# Patient Record
(demographics unavailable — no encounter records)

---

## 2025-01-16 NOTE — DISCUSSION/SUMMARY
[FreeTextEntry1] : This yamilet patient began transitioning since 20 years ago, at age 18, around 2004. She has been on hormonal therapy consistently since 2004, but has stopped 6 months ago due to insurance issues. She has been in therapy and was diagnosed with Gender Dysphoria concerned about certain facial features that appear masculine and cause bullying desires facial feminization surgery followed by Transgender team. The following facial features appear masculine and will need to be modified: -  Allergies: - NKA  Meds: - OTC probiotics - Vitamin B-12  Surgical History Surgery Type: Breast augmentation Year: 2008 Complications?: None  Surgery Type: Breast augmentation, revision Year: 2021 Complications?: None  Surgery Type: Silicone removal of hip/buttock region Complications?: None  Denies previous botox, fillers or silicone injections to the face.  SH: Denies marijuana use, Denies Cigarette use  ROS: General health / Constitutional: no fever, no chills, no unusual weight changes, no energy level changes, no night sweats Skin: No color or pigmentation changes, no pruritis, no rashes, no ulcers, Hair: No changes in color, texture, distribution, loss Nails: No color changes, brittleness, infection Head: No headaches, no new jaw pain Eyes: Good visual acuity, no color blindness, no corrective lenses, no photophobia, no diplopia, no blurred vision, no infection, pain, no medications, Ear: no tinnitus, no discharge, no pain, no medications Nose: no epistaxis, no rhinorrhea, no rhinitis, no pain, Mouth & Throat: no gingivitis, no gingival bleeding, no ulcers, no voice changes, no changes in oral mucosa or tongue Neck no stiffness, no pain, no lymphadenitis, no thyroid disorders, Respiratory: no cough, no dyspnea, no wheezing, no chest pain, cyanosis, no pneumonia, no asthma, Cardiovascular: no chest pain, no palpitations, no irregular rhythm, no tachycardia, no bradycardia, no heart failure, no dyspnea on exertion (CLEARY), no edema Gastrointestinal: no nausea / vomiting, no dysphagia, no reflux / GERD, no abdominal pain, no jaundice Musculoskeletal: no pain in muscles, bones, or joints; no fractures, no dislocations, no muscular weakness, no atrophy Neurological: no paresis, no paralysis, no paresthesia, no seizures, no dizziness, no syncope, no ataxia, no tremor Psychological: no childhood behavioral problems, no irritability, no sleep changes Hematological: no anemia, no bruising, no bleeding tendencies,  PHYSICAL EXAM General: WDWN, in no distress, A & O x 3 (person, place, time) HEENT Head: AT/NC (atraumatic, normocephalic), including TMJ, sensory and motor; + Prominent brow and lateral orbital rim type III Eyes: EOMI, PERRLA Ears: exterior, nl hearing, Nose: + slightly wide, bulbous nasal tip with acute nasolabial angle intranasal exam showed enlarged turbinates and deviated caudal septum Throat & mouth: gd palate elevation, nl tongue mobility, nl tonsil size; + Prominent mandibular angle with active masseter, boxy wide chin, Hypoplastic cheeks, Hypoplastic upper and lower lips. Neck: no masses, nl pulses, no prominent tracheal bulge ("Saleem's Apple")  We had a 45 minute meeting with the patient discussing diagnosis and medical management issues and outcomes.  First stage FFS: 21139: Frontal sinus anterior wall setback 78486: Orbital reconstruction bilateral 38658: Hairline lowering 08780: Browlift bilateral 13197: Supraorbital bar recontouring bilateral 88874: Tarsorrhaphy bilateral 73287: Mandibular angle contouring 79857-05: Mandibular angle osteotomy 98672: Mandibular reconstruction with autologous tissue bilateral 83805: Osseous genioplasty narrowing, shortening 63690: Mandibular reconstruction with prosthetics bilateral 75600: Rhinoplasty open 99593: Submucous resection of septum 15626: Cartilage grafting for nasal reconstruction, use of cadaveric cartilage for  grafts, columellar strut, tip graft 74969: Tracheal shave, Laryngeothyroidoplasty 29922: Submental fat or buccal fat excision, 58128: platysmaplasty 39834: Upper lip augmentation with temporalis fascia 38351: Fat grafting to malar regions bilateral from abdomen first 25 ccs 22092: Fat grafting to malar regions bilateral from abdomen > 25 ccs 25168-16: Bilateral cheek implants 35857: Rhytidectomy, facelift, lateral platsymoplasty, SMAS-ectomy, MACs-lift 91225: Silicone removal bilateral malar regions  Needs a 3D CT scan and Virtual Surgical Planning. She will need to provide a letter from her therapist and hormone provider. Will need PCP clearance.

## 2025-01-16 NOTE — DISCUSSION/SUMMARY
[FreeTextEntry1] : This yamilet patient began transitioning since 20 years ago, at age 18, around 2004. She has been on hormonal therapy consistently since 2004, but has stopped 6 months ago due to insurance issues. She has been in therapy and was diagnosed with Gender Dysphoria concerned about certain facial features that appear masculine and cause bullying desires facial feminization surgery followed by Transgender team. The following facial features appear masculine and will need to be modified: -  Allergies: - NKA  Meds: - OTC probiotics - Vitamin B-12  Surgical History Surgery Type: Breast augmentation Year: 2008 Complications?: None  Surgery Type: Breast augmentation, revision Year: 2021 Complications?: None  Surgery Type: Silicone removal of hip/buttock region Complications?: None  Denies previous botox, fillers or silicone injections to the face.  SH: Denies marijuana use, Denies Cigarette use  ROS: General health / Constitutional: no fever, no chills, no unusual weight changes, no energy level changes, no night sweats Skin: No color or pigmentation changes, no pruritis, no rashes, no ulcers, Hair: No changes in color, texture, distribution, loss Nails: No color changes, brittleness, infection Head: No headaches, no new jaw pain Eyes: Good visual acuity, no color blindness, no corrective lenses, no photophobia, no diplopia, no blurred vision, no infection, pain, no medications, Ear: no tinnitus, no discharge, no pain, no medications Nose: no epistaxis, no rhinorrhea, no rhinitis, no pain, Mouth & Throat: no gingivitis, no gingival bleeding, no ulcers, no voice changes, no changes in oral mucosa or tongue Neck no stiffness, no pain, no lymphadenitis, no thyroid disorders, Respiratory: no cough, no dyspnea, no wheezing, no chest pain, cyanosis, no pneumonia, no asthma, Cardiovascular: no chest pain, no palpitations, no irregular rhythm, no tachycardia, no bradycardia, no heart failure, no dyspnea on exertion (CLEARY), no edema Gastrointestinal: no nausea / vomiting, no dysphagia, no reflux / GERD, no abdominal pain, no jaundice Musculoskeletal: no pain in muscles, bones, or joints; no fractures, no dislocations, no muscular weakness, no atrophy Neurological: no paresis, no paralysis, no paresthesia, no seizures, no dizziness, no syncope, no ataxia, no tremor Psychological: no childhood behavioral problems, no irritability, no sleep changes Hematological: no anemia, no bruising, no bleeding tendencies,  PHYSICAL EXAM General: WDWN, in no distress, A & O x 3 (person, place, time) HEENT Head: AT/NC (atraumatic, normocephalic), including TMJ, sensory and motor; + Prominent brow and lateral orbital rim type III Eyes: EOMI, PERRLA Ears: exterior, nl hearing, Nose: + slightly wide, bulbous nasal tip with acute nasolabial angle intranasal exam showed enlarged turbinates and deviated caudal septum Throat & mouth: gd palate elevation, nl tongue mobility, nl tonsil size; + Prominent mandibular angle with active masseter, boxy wide chin, Hypoplastic cheeks, Hypoplastic upper and lower lips. Neck: no masses, nl pulses, no prominent tracheal bulge ("Saleem's Apple")  We had a 45 minute meeting with the patient discussing diagnosis and medical management issues and outcomes.  First stage FFS: 21139: Frontal sinus anterior wall setback 38158: Orbital reconstruction bilateral 61759: Hairline lowering 83872: Browlift bilateral 11490: Supraorbital bar recontouring bilateral 69437: Tarsorrhaphy bilateral 33989: Mandibular angle contouring 19337-23: Mandibular angle osteotomy 45602: Mandibular reconstruction with autologous tissue bilateral 48064: Osseous genioplasty narrowing, shortening 56927: Mandibular reconstruction with prosthetics bilateral 86076: Rhinoplasty open 72265: Submucous resection of septum 18057: Cartilage grafting for nasal reconstruction, use of cadaveric cartilage for  grafts, columellar strut, tip graft 88776: Tracheal shave, Laryngeothyroidoplasty 05504: Submental fat or buccal fat excision, 04066: platysmaplasty 87191: Upper lip augmentation with temporalis fascia 83840: Fat grafting to malar regions bilateral from abdomen first 25 ccs 61976: Fat grafting to malar regions bilateral from abdomen > 25 ccs 78244-65: Bilateral cheek implants 62933: Rhytidectomy, facelift, lateral platsymoplasty, SMAS-ectomy, MACs-lift 98022: Silicone removal bilateral malar regions  Needs a 3D CT scan and Virtual Surgical Planning. She will need to provide a letter from her therapist and hormone provider. Will need PCP clearance.

## 2025-04-15 NOTE — DISCUSSION/SUMMARY
[FreeTextEntry1] : History of Present Illness       The provider, PACO SWAIN, was located at the medical office located in  at the time of the visit. The patient, JOSE LUIS SANTIAGO and Provider participated in the telehealth encounter.  Active Problems Gender dysphoria (302.6) (F64.9)  Discussion/Summary     This visit was provided via telehealth using real-time 2 way audio and visual technology. The patient was located at home and I was located at my office at [address] [place] [address], [address] at the time of the telehealth visit.Verbal consent was given by the patient; both the patient and I participated in the telehealth encounter.  This yamilet patient began transitioning since 20 years ago, at age 18, around 2004. She has been on hormonal therapy consistently since 2004, but has stopped 6 months ago due to insurance issues. She has been in therapy and was diagnosed with Gender Dysphoria concerned about certain facial features that appear masculine and cause bullying desires facial feminization surgery followed by Transgender team. The following facial features appear masculine and will need to be modified: -  Allergies: - NKA  Meds: - OTC probiotics - Vitamin B-12 -spironolactone 100mg -estradiol 200mg/5mL injection, 15mL every other week -vyvanse -wegovy: last dose late March 2025  Surgical History Surgery Type: Breast augmentation Year: 2008 Complications?: None  Surgery Type: Breast augmentation, revision Year: 2021 Complications?: None  Surgery Type: Silicone removal of hip/buttock region Year:2019 Complications?: None  Silicone injections to hip/buttocks, 2010 approximately  Denies previous botox, fillers or silicone injections to the face.  SH: Denies marijuana use, Denies Cigarette use, no vaping No alcohol use  ROS: General health / Constitutional: no fever, no chills, no unusual weight changes, no energy level changes, no night sweats Skin: No color or pigmentation changes, no pruritis, no rashes, no ulcers, Hair: No changes in color, texture, distribution, loss Nails: No color changes, brittleness, infection Head: No headaches, no new jaw pain Eyes: Good visual acuity, no color blindness, no corrective lenses, no photophobia, no diplopia, no blurred vision, no infection, pain, no medications, Ear: no tinnitus, no discharge, no pain, no medications Nose: no epistaxis, no rhinorrhea, no rhinitis, no pain, Mouth & Throat: no gingivitis, no gingival bleeding, no ulcers, no voice changes, no changes in oral mucosa or tongue Neck no stiffness, no pain, no lymphadenitis, no thyroid disorders, Respiratory: no cough, no dyspnea, no wheezing, no chest pain, cyanosis, no pneumonia, no asthma, Cardiovascular: no chest pain, no palpitations, no irregular rhythm, no tachycardia, no bradycardia, no heart failure, no dyspnea on exertion (CLEARY), no edema Gastrointestinal: no nausea / vomiting, no dysphagia, no reflux / GERD, no abdominal pain, no jaundice Musculoskeletal: no pain in muscles, bones, or joints; no fractures, no dislocations, no muscular weakness, no atrophy Neurological: no paresis, no paralysis, no paresthesia, no seizures, no dizziness, no syncope, no ataxia, no tremor Psychological: no childhood behavioral problems, no irritability, no sleep changes Hematological: no anemia, no bruising, no bleeding tendencies,  PHYSICAL EXAM General: WDWN, in no distress, A & O x 3 (person, place, time) HEENT Head: AT/NC (atraumatic, normocephalic), including TMJ, sensory and motor; + Prominent brow and lateral orbital rim type III Eyes: EOMI, PERRLA Ears: exterior, nl hearing, Nose: + slightly wide, bulbous nasal tip with acute nasolabial angle intranasal exam showed enlarged turbinates and deviated caudal septum Throat & mouth: gd palate elevation, nl tongue mobility, nl tonsil size; + Prominent mandibular angle with active masseter, boxy wide chin, Hypoplastic cheeks, Hypoplastic upper and lower lips. Neck: no masses, nl pulses, no prominent tracheal bulge ("Saleem's Apple")  We had a 45 minute meeting with the patient discussing diagnosis and medical management issues and outcomes.   FFS: 51631: Frontal sinus anterior wall setback 54262: Orbital reconstruction bilateral 57574: Hairline lowering 62725: Browlift bilateral 20017: Supraorbital bar recontouring bilateral 27924: Tarsorrhaphy bilateral 68615: Mandibular angle contouring 62307-76: Mandibular angle osteotomy 52283: Osseous genioplasty narrowing, shortening 60539: Rhinoplasty open 70048: Submucous resection of septum 81210: Cartilage grafting for nasal reconstruction, use of cadaveric cartilage for  grafts, columellar strut, tip graft 95058: Tracheal shave, Laryngeothyroidoplasty 71798: Submental fat or buccal fat excision, 65827: platysmaplasty 76872: Upper lip augmentation with temporalis fascia 10062: Fat grafting to malar regions bilateral from abdomen first 25 ccs 10448: Fat grafting to malar regions bilateral from abdomen > 25 ccs 21088-30: Bilateral cheek implants 27736: Rhytidectomy, facelift, lateral platsymoplasty, SMAS-ectomy, MACs-lift 96570: Silicone removal bilateral malar regions     The patient's medications, allergies, and PMH were updated to reflect accuracy.  All of patient's questions and concerns were answered during this appointment. The patient met with Paco Swain M.D. in conjunction with Maryse Lucio N.P. All patient questions and concerns were answered and addressed during this appointment time.     - Prepare soft foods (applesauce, mash potatoes, smoothies, soups, oatmeal) for postop soft diet. - Your at-home post op medications will be available for  at the hospital's pharmacy when you are discharged. - Do not eat or drink after 12am the night before surgery. - The hospital will call you the day before surgery to let you know what time your surgery starts and what time to arrive to the hospital. - Contact us with any questions or concerns. -Stay in [place] for at least 7-14 days   We had a 35 minute meeting with the patient discussing diagnosis and medical management issues and outcomes. CT scan received, letters received, patient is completing her PST with her primary care doctor.   We discussed the instructions and the patient confirmed an understanding of them. We reviewed these procedures and their risks   Bleeding- It is possible, though unusual, to experience a bleeding episode during or after surgery. Should post-operative bleeding occur, it may require emergency treatment to drain accumulated blood or blood transfusion. Intra-operative blood transfusion may also be required. Do not take any aspirin or anti-inflammatory medications for ten days before or after surgery, as this may increase the risk of bleeding. Non-prescription herbs and dietary supplements can increase the risk of surgical bleeding. Hematoma can occur at any time following injury to the breast. If blood transfusions are necessary to treat blood loss, there is the risk of blood-related infections such as hepatitis and HIV (AIDS). Heparin medications that are used to prevent blood clots in veins can produce bleeding and decreased blood platelets.   Infection- Infection is unusual after surgery. Should an infection occur, additional treatment including antibiotics, hospitalization, or additional surgery may be necessary.   Change Skin Sensation- You may experience a diminished (or loss) of sensitivity of the skin of operative area. Partial or permanent loss of skin sensation can occur after surgery. Skin Contour Irregularities- Contour and shape irregularities may occur after surgery. Visible and palpable wrinkling may occur. Residual skin irregularities at the ends of the incisions or dog ears are always a possibility when there is excessive redundant skin. This may improve with time, or it can be surgically corrected.   Sutures- Most surgical techniques use deep sutures. You may notice these sutures after your surgery. Sutures may spontaneously poke through the skin, become visible or produce irritation that requires suture removal.   Skin Discoloration / Swelling- Some bruising and swelling normally occurs following surgery. The skin in or near the surgical site can appear either lighter or darker than surrounding skin. Although uncommon, swelling and skin discoloration may persist for long periods of time and, in rare situations, may be permanent.   Skin Sensitivity- Itching, tenderness, or exaggerated responses to hot or cold temperatures may occur after surgery. Usually this resolves during healing, but in rare situations it may be chronic.   Scarring- All surgery leaves scars, some more visible than others. Although good wound healing after a surgical procedure is expected, abnormal scars may occur within the skin and deeper tissues. Scars may be unattractive and of different color than the surrounding skin tone. Scar appearance may also vary within the same scar. Scars may be asymmetrical (appear different on the right and left side of the body). There is the possibility of visible marks in the skin from sutures. In some cases scars may require surgical revision or treatment.   Damage to Deeper Structures- There is the potential for injury to deeper structures including, nerves, blood vessels, muscles during any surgical procedure. The potential for this to occur varies according to the type of procedure being performed. Injury to deeper structures may be temporary or permanent.   Delayed Healing- Wound disruption or delayed wound healing is possible. Some areas of the skin may not heal normally and may take a long time to heal. Areas of skin tissue may die. This may require frequent dressing changes or further surgery to remove the non-healed tissue. Individuals who have decreased blood supply to tissue from past surgery or radiation therapy may be at increased risk for wound healing and poor surgical outcome. Smokers have a greater risk of skin loss and wound healing complications.   Fat Necrosis- Fatty tissue found deep in the skin might die. This may produce areas of firmness within the skin. Additional surgery to remove areas of fat necrosis may be necessary. There is the possibility of contour irregularities in the skin that may result from fat necrosis.   Allergic Reactions- In rare cases, local allergies to tape, suture material and glues, blood products, topical preparations or injected agents have been reported. Serious systemic reactions including shock (anaphylaxis) may occur in response to drugs used during surgery and prescription medicines. Allergic reactions may require additional treatment.

## 2025-04-15 NOTE — DISCUSSION/SUMMARY
[FreeTextEntry1] : History of Present Illness       The provider, PACO SWAIN, was located at the medical office located in  at the time of the visit. The patient, JOSE LUIS SANTIAGO and Provider participated in the telehealth encounter.  Active Problems Gender dysphoria (302.6) (F64.9)  Discussion/Summary     This visit was provided via telehealth using real-time 2 way audio and visual technology. The patient was located at home and I was located at my office at [address] [place] [address], [address] at the time of the telehealth visit.Verbal consent was given by the patient; both the patient and I participated in the telehealth encounter.  This yamilet patient began transitioning since 20 years ago, at age 18, around 2004. She has been on hormonal therapy consistently since 2004, but has stopped 6 months ago due to insurance issues. She has been in therapy and was diagnosed with Gender Dysphoria concerned about certain facial features that appear masculine and cause bullying desires facial feminization surgery followed by Transgender team. The following facial features appear masculine and will need to be modified: -  Allergies: - NKA  Meds: - OTC probiotics - Vitamin B-12 -spironolactone 100mg -estradiol 200mg/5mL injection, 15mL every other week -vyvanse -wegovy: last dose late March 2025  Surgical History Surgery Type: Breast augmentation Year: 2008 Complications?: None  Surgery Type: Breast augmentation, revision Year: 2021 Complications?: None  Surgery Type: Silicone removal of hip/buttock region Year:2019 Complications?: None  Silicone injections to hip/buttocks, 2010 approximately  Denies previous botox, fillers or silicone injections to the face.  SH: Denies marijuana use, Denies Cigarette use, no vaping No alcohol use  ROS: General health / Constitutional: no fever, no chills, no unusual weight changes, no energy level changes, no night sweats Skin: No color or pigmentation changes, no pruritis, no rashes, no ulcers, Hair: No changes in color, texture, distribution, loss Nails: No color changes, brittleness, infection Head: No headaches, no new jaw pain Eyes: Good visual acuity, no color blindness, no corrective lenses, no photophobia, no diplopia, no blurred vision, no infection, pain, no medications, Ear: no tinnitus, no discharge, no pain, no medications Nose: no epistaxis, no rhinorrhea, no rhinitis, no pain, Mouth & Throat: no gingivitis, no gingival bleeding, no ulcers, no voice changes, no changes in oral mucosa or tongue Neck no stiffness, no pain, no lymphadenitis, no thyroid disorders, Respiratory: no cough, no dyspnea, no wheezing, no chest pain, cyanosis, no pneumonia, no asthma, Cardiovascular: no chest pain, no palpitations, no irregular rhythm, no tachycardia, no bradycardia, no heart failure, no dyspnea on exertion (CLEARY), no edema Gastrointestinal: no nausea / vomiting, no dysphagia, no reflux / GERD, no abdominal pain, no jaundice Musculoskeletal: no pain in muscles, bones, or joints; no fractures, no dislocations, no muscular weakness, no atrophy Neurological: no paresis, no paralysis, no paresthesia, no seizures, no dizziness, no syncope, no ataxia, no tremor Psychological: no childhood behavioral problems, no irritability, no sleep changes Hematological: no anemia, no bruising, no bleeding tendencies,  PHYSICAL EXAM General: WDWN, in no distress, A & O x 3 (person, place, time) HEENT Head: AT/NC (atraumatic, normocephalic), including TMJ, sensory and motor; + Prominent brow and lateral orbital rim type III Eyes: EOMI, PERRLA Ears: exterior, nl hearing, Nose: + slightly wide, bulbous nasal tip with acute nasolabial angle intranasal exam showed enlarged turbinates and deviated caudal septum Throat & mouth: gd palate elevation, nl tongue mobility, nl tonsil size; + Prominent mandibular angle with active masseter, boxy wide chin, Hypoplastic cheeks, Hypoplastic upper and lower lips. Neck: no masses, nl pulses, no prominent tracheal bulge ("Saleem's Apple")  We had a 45 minute meeting with the patient discussing diagnosis and medical management issues and outcomes.   FFS: 06290: Frontal sinus anterior wall setback 98414: Orbital reconstruction bilateral 61454: Hairline lowering 78838: Browlift bilateral 97270: Supraorbital bar recontouring bilateral 30614: Tarsorrhaphy bilateral 72478: Mandibular angle contouring 90416-36: Mandibular angle osteotomy 34243: Osseous genioplasty narrowing, shortening 55750: Rhinoplasty open 32444: Submucous resection of septum 88566: Cartilage grafting for nasal reconstruction, use of cadaveric cartilage for  grafts, columellar strut, tip graft 77463: Tracheal shave, Laryngeothyroidoplasty 52382: Submental fat or buccal fat excision, 95167: platysmaplasty 66740: Upper lip augmentation with temporalis fascia 22658: Fat grafting to malar regions bilateral from abdomen first 25 ccs 27506: Fat grafting to malar regions bilateral from abdomen > 25 ccs 03280-39: Bilateral cheek implants 53375: Rhytidectomy, facelift, lateral platsymoplasty, SMAS-ectomy, MACs-lift 20932: Silicone removal bilateral malar regions     The patient's medications, allergies, and PMH were updated to reflect accuracy.  All of patient's questions and concerns were answered during this appointment. The patient met with Paco Swain M.D. in conjunction with Maryse Lucio N.P. All patient questions and concerns were answered and addressed during this appointment time.     - Prepare soft foods (applesauce, mash potatoes, smoothies, soups, oatmeal) for postop soft diet. - Your at-home post op medications will be available for  at the hospital's pharmacy when you are discharged. - Do not eat or drink after 12am the night before surgery. - The hospital will call you the day before surgery to let you know what time your surgery starts and what time to arrive to the hospital. - Contact us with any questions or concerns. -Stay in [place] for at least 7-14 days   We had a 35 minute meeting with the patient discussing diagnosis and medical management issues and outcomes. CT scan received, letters received, patient is completing her PST with her primary care doctor.   We discussed the instructions and the patient confirmed an understanding of them. We reviewed these procedures and their risks   Bleeding- It is possible, though unusual, to experience a bleeding episode during or after surgery. Should post-operative bleeding occur, it may require emergency treatment to drain accumulated blood or blood transfusion. Intra-operative blood transfusion may also be required. Do not take any aspirin or anti-inflammatory medications for ten days before or after surgery, as this may increase the risk of bleeding. Non-prescription herbs and dietary supplements can increase the risk of surgical bleeding. Hematoma can occur at any time following injury to the breast. If blood transfusions are necessary to treat blood loss, there is the risk of blood-related infections such as hepatitis and HIV (AIDS). Heparin medications that are used to prevent blood clots in veins can produce bleeding and decreased blood platelets.   Infection- Infection is unusual after surgery. Should an infection occur, additional treatment including antibiotics, hospitalization, or additional surgery may be necessary.   Change Skin Sensation- You may experience a diminished (or loss) of sensitivity of the skin of operative area. Partial or permanent loss of skin sensation can occur after surgery. Skin Contour Irregularities- Contour and shape irregularities may occur after surgery. Visible and palpable wrinkling may occur. Residual skin irregularities at the ends of the incisions or dog ears are always a possibility when there is excessive redundant skin. This may improve with time, or it can be surgically corrected.   Sutures- Most surgical techniques use deep sutures. You may notice these sutures after your surgery. Sutures may spontaneously poke through the skin, become visible or produce irritation that requires suture removal.   Skin Discoloration / Swelling- Some bruising and swelling normally occurs following surgery. The skin in or near the surgical site can appear either lighter or darker than surrounding skin. Although uncommon, swelling and skin discoloration may persist for long periods of time and, in rare situations, may be permanent.   Skin Sensitivity- Itching, tenderness, or exaggerated responses to hot or cold temperatures may occur after surgery. Usually this resolves during healing, but in rare situations it may be chronic.   Scarring- All surgery leaves scars, some more visible than others. Although good wound healing after a surgical procedure is expected, abnormal scars may occur within the skin and deeper tissues. Scars may be unattractive and of different color than the surrounding skin tone. Scar appearance may also vary within the same scar. Scars may be asymmetrical (appear different on the right and left side of the body). There is the possibility of visible marks in the skin from sutures. In some cases scars may require surgical revision or treatment.   Damage to Deeper Structures- There is the potential for injury to deeper structures including, nerves, blood vessels, muscles during any surgical procedure. The potential for this to occur varies according to the type of procedure being performed. Injury to deeper structures may be temporary or permanent.   Delayed Healing- Wound disruption or delayed wound healing is possible. Some areas of the skin may not heal normally and may take a long time to heal. Areas of skin tissue may die. This may require frequent dressing changes or further surgery to remove the non-healed tissue. Individuals who have decreased blood supply to tissue from past surgery or radiation therapy may be at increased risk for wound healing and poor surgical outcome. Smokers have a greater risk of skin loss and wound healing complications.   Fat Necrosis- Fatty tissue found deep in the skin might die. This may produce areas of firmness within the skin. Additional surgery to remove areas of fat necrosis may be necessary. There is the possibility of contour irregularities in the skin that may result from fat necrosis.   Allergic Reactions- In rare cases, local allergies to tape, suture material and glues, blood products, topical preparations or injected agents have been reported. Serious systemic reactions including shock (anaphylaxis) may occur in response to drugs used during surgery and prescription medicines. Allergic reactions may require additional treatment.

## 2025-05-26 NOTE — DISCUSSION/SUMMARY
[FreeTextEntry1] : S/P Facial Feminization Surgery on 5/16/2025 Patient is without complains and is happy with the result; Swelling and eccymosis are still present but improving; Incisions are intact; Hairline with dissolvable sutures and eschars some chin numbness Advised to sleep with head at 45 degrees to reduce swelling; Tolerating a soft diet; Advance to regular diet; Resume normal activities; Stop any narcotics; Take Motrin 600mg with food every 6 hours; Continue with Jawbra at nighttime for 6 weeks Start nasal saline spray BID for 2 weeks and Afrin spray BID for 3 days Follow up in 2 weeks

## 2025-06-01 NOTE — DISCUSSION/SUMMARY
[FreeTextEntry1] : Ada  presents 2 weeks post-operatively, s/p FFS (brow lift, frontal sinus setback, SOB recontouring, jawline and chin, rhinoplasty with grafting, fat grafting to cheeks, platysmaplasty.)   The patient met with Javid Soto M.D. in conjunction with Maryse Lucio N.P. All patient questions and concerns were answered and addressed during this appointment time. pt returning to [place] this week, all subsequent f/u visits will be virtual  Chief Complaint: Patient recovering well with no significant complaints; denies pain and has discontinued all pain medication. Mild swelling of mid to lower face, mild numbness along surgical sites (hairline, jawline, submentum), no bruising.  Reassure pt that facelift was not indicated at this time.  Medications: -Patient denies needing refills on medication  -Taking medication as prescribed. No longer on narcotics; using ibuprofen, sparingly, as needed for pain. -OTC mouthwash AM and PM and after each meal.  okay to use red light therapy and begin lymphatic massages at home  Incisions: -Incision sites are healing well, well-approximated with no evidence of infection. Incision sites located at submentum, nostrils, intraorally, and at hairline. -Negative for signs of inflammation (redness, significant pain or swelling, discharge, foul odor/smell). -Patient denies smoking. -Patient to cover incision sites of scalp with scarf/hat if in direct sunlight. -Reviewed incision care. Patient cleanses daily, hairline every other day. Using hydrogen peroxide on clean q-tip to gently remove build-up and dried blood (especially at nose and hairline).  Sutures: -At this time, all non-dissolvable sutures have been removed.  -Educated patient on dissolvable nature of all other sutures. -Use bacitracin ointment on surgical incisions about nose, keep lips hydrated with vaseline   Nose:  -Nose-taping daily. Patient given supply of tape previously, does not require more.  -Patient can use saline nasal spray prn. Use Afrin prn congestion 2x daily for a maximum for 2 days. -Little to no ecchymosis present about periorbital and mid face regions and moderate edema. Reassured patient.  Lower Face:   -Patient to continue to use jawbra nightly, can use daily at home if she desires. -Maintain head elevation while sleeping, at least 30 degrees, using two to three pillows or a wedge pillow. -Maintaining good oral care/hygiene, using regular mouthwash and maintaining regular oral hygiene with gentle brushing. -Patient maintains soft food diet, progressing to normal diet slowly.. Denies nausea or vomiting.     Precautions:   -Patient to contact office or come in sooner than follow-up appointment if she notices discharge/drainage, foul odor/taste, uncontrolled bleeding, severe pain not improved with pain-medication regimen, dramatic increase in swelling, nausea//vomiting, chills/fever, or onset of new symptoms.    -Reviewed common post-op occurrences and adverse reactions. All of patient's questions and concerns were answered and addressed during this appointment.   -Patient pending clearance for scar gels/sunscreen use/ exercise/laser/electrolysis. Patient can anticipate returning to these activities around 6 weeks post-operatively.   -Patient to advance activity as tolerated, but is not encouraged to engage in exercise, heavy lifting, or having head below heart for prolonged duration.   -Patient to call to schedule follow up, follow-up in approximately 3 weeks, telehealth